# Patient Record
Sex: FEMALE | Race: WHITE | Employment: UNEMPLOYED | URBAN - METROPOLITAN AREA
[De-identification: names, ages, dates, MRNs, and addresses within clinical notes are randomized per-mention and may not be internally consistent; named-entity substitution may affect disease eponyms.]

---

## 2021-03-11 ENCOUNTER — HOSPITAL ENCOUNTER (EMERGENCY)
Age: 26
Discharge: HOME | End: 2021-03-11
Payer: COMMERCIAL

## 2021-03-11 VITALS
RESPIRATION RATE: 16 BRPM | BODY MASS INDEX: 21.66 KG/M2 | DIASTOLIC BLOOD PRESSURE: 84 MMHG | TEMPERATURE: 98.8 F | SYSTOLIC BLOOD PRESSURE: 117 MMHG | OXYGEN SATURATION: 100 % | HEIGHT: 65 IN | WEIGHT: 130 LBS | HEART RATE: 105 BPM

## 2021-03-11 DIAGNOSIS — L03.115 CELLULITIS OF RIGHT FOOT: Primary | ICD-10-CM

## 2021-03-11 DIAGNOSIS — F19.10 IV DRUG ABUSE (HCC): ICD-10-CM

## 2021-03-11 PROCEDURE — 99284 EMERGENCY DEPT VISIT MOD MDM: CPT

## 2021-03-11 PROCEDURE — 6370000000 HC RX 637 (ALT 250 FOR IP): Performed by: PHYSICIAN ASSISTANT

## 2021-03-11 RX ORDER — DOXYCYCLINE HYCLATE 100 MG
100 TABLET ORAL ONCE
Status: COMPLETED | OUTPATIENT
Start: 2021-03-11 | End: 2021-03-11

## 2021-03-11 RX ORDER — DOXYCYCLINE HYCLATE 100 MG
100 TABLET ORAL 2 TIMES DAILY
Qty: 20 TABLET | Refills: 0 | Status: SHIPPED | OUTPATIENT
Start: 2021-03-11 | End: 2021-03-21

## 2021-03-11 RX ADMIN — DOXYCYCLINE HYCLATE 100 MG: 100 TABLET, COATED ORAL at 14:13

## 2021-03-11 ASSESSMENT — PAIN DESCRIPTION - ORIENTATION
ORIENTATION: RIGHT
ORIENTATION: RIGHT

## 2021-03-11 ASSESSMENT — PAIN DESCRIPTION - PAIN TYPE
TYPE: ACUTE PAIN
TYPE: ACUTE PAIN

## 2021-03-11 ASSESSMENT — PAIN DESCRIPTION - LOCATION: LOCATION: FOOT

## 2021-03-11 NOTE — ED PROVIDER NOTES
[facility]  ED  eMERGENCY dEPARTMENT eNCOUnter        Pt Name: Ana Castillo  MRN: [id number]  [place] 1995  Date of evaluation: 3/11/2021  Provider: Vijay Saenz PA-C  PCP: Manfred Mascorro MD  ED Attending: Randy Prather MD    Patient was not seen by ED physician. History is provided by the patient    CHIEF COMPLAINT:  Foot Swelling (pt shot up in right foot and foot now red, swollen, and hot; swelling began today )      HISTORY OF PRESENT ILLNESS:  Ana Castillo is a 30 y.o. female who presents to the ED via private vehicle with complaints of right foot swelling and pain after attempting to shoot up meth in the foot. Patient states that this morning she attempted to inject methamphetamines into her right foot. Since this attempted she developed pain, swelling and a subtle redness to the top of her foot. She rates the pain 4/10. She states her tetanus vaccine is up-to-date. No other areas of skin change or soft tissue swelling. No other complaints, modifying factors or associated symptoms. Nursing notes reviewed.    Past Medical History:   Diagnosis Date    Bipolar 1 disorder ([facility]     Depression     Drug addiction ([facility]     heroin and meth    Hepatitis C virus infection 8/6/15    antibody +    MRSA (methicillin resistant staph aureus) culture positive 2012    Right knee     Past Surgical History:   Procedure Laterality Date    WISDOM TOOTH EXTRACTION       Family History   Problem Relation Age of Onset    Substance Abuse Mother     Alcohol Abuse Mother     Cancer Mother         cervical    Alcohol Abuse Maternal Grandmother     Cancer Maternal Grandmother     Alcohol Abuse Maternal Grandfather     Cancer Maternal Grandfather         lung     Social History     Socioeconomic History    Marital status: Single     Spouse name: Not on file    Number of children: Not on file    Years of education: Not on file    Highest education level: Not on file   Occupational History  Not on file   Social Needs    Financial resource strain: Not on file    Food insecurity     Worry: Not on file     Inability: Not on file    Transportation needs     Medical: Not on file     Non-medical: Not on file   Tobacco Use    Smoking status: Current Every Day Smoker     Packs/day: 1.00     Years: 13.00     Pack years: 13.00     Types: Cigarettes    Smokeless tobacco: Never Used   Substance and Sexual Activity    Alcohol use: Not Currently     Alcohol/week: 0.0 standard drinks    Drug use: Yes     Types: IV, Methamphetamines, Marijuana, Opiates      Comment: 3/11/21 last drug use    Sexual activity: Yes     Partners: Male   Lifestyle    Physical activity     Days per week: Not on file     Minutes per session: Not on file    Stress: Not on file   Relationships    Social connections     Talks on phone: Not on file     Gets together: Not on file     Attends Taoism service: Not on file     Active member of club or organization: Not on file     Attends meetings of clubs or organizations: Not on file     Relationship status: Not on file    Intimate partner violence     Fear of current or ex partner: Not on file     Emotionally abused: Not on file     Physically abused: Not on file     Forced sexual activity: Not on file   Other Topics Concern    Not on file   Social History Narrative    Not on file     No current facility-administered medications for this encounter. Current Outpatient Medications   Medication Sig Dispense Refill    doxycycline hyclate (VIBRA-TABS) 100 MG tablet Take 1 tablet by mouth 2 times daily for 10 days 20 tablet 0     No Known Allergies    REVIEW OF SYSTEMS:  6 systems reviewed, pertinent positives per HPI otherwise noted to be negative. PHYSICAL EXAM:  /84   Pulse 105   Temp 98.8 °F (37.1 °C) (Oral)   Resp 16   Ht 5' 5\" (1.651 m)   Wt 130 lb (59 kg)   LMP 02/19/2021   SpO2 100%   BMI 21.63 kg/m²   CONSTITUTIONAL: Awake and alert. Well-developed. Well-nourished. Non-toxic. Cooperative. No acute distress. HENT: Normocephalic. Atraumatic. External ears normal, without discharge. Nose normal. Mucous membranes moist.  EYES: Conjunctiva non-injected. No scleral icterus. PERRL. EOM's grossly intact. NECK: Supple. Normal ROM. CARDIOVASCULAR: Mild tachycardia. Intact distal pulses. PULMONARY/CHEST WALL: Breathing is unlabored. Equal, symmetric chest rise. Speaking comfortably in full sentences. ABDOMEN: Nondistended  MUSKULOSKELETAL: Right foot with mild, diffuse swelling. No acute deformity. Faint erythema to the dorsum of the foot along with an old tattoo. Mild tenderness to the top of the foot. No crepitus. Normal ROM. See picture of right foot below. SKIN: Warm and dry. NEUROLOGICAL: Alert and oriented x 3. Strength is 5/5 in all extremities and sensation is intact. PSYCHIATRIC: Normal affect      Right foot:              Labs:    None    RADIOLOGY:    None        ED COURSE/MDM:  Patient was given the following medications:  Medications   doxycycline hyclate (VIBRA-TABS) tablet 100 mg (100 mg Oral Given 3/11/21 1413)       I have evaluated this patient here in the ED. Patient arrives to the ED with right foot pain, swelling and faint redness that started after she attempted to inject methamphetamine to the top of her foot this morning. I do believe the symptoms are combination of skin/soft tissue irritation from the injection of the chemical but additionally likely brewing infection. Patient will be started on doxycycline. She will be given podiatry follow-up. Return precautions discussed. Patient was given scripts for the following medications. I counseled patient how to take these medications.    Discharge Medication List as of 3/11/2021  2:22 PM      START taking these medications    Details   doxycycline hyclate (VIBRA-TABS) 100 MG tablet Take 1 tablet by mouth 2 times daily for 10 days, Disp-20 tablet, R-0Normal           I estimate there is LOW risk for SEPSIS, ABSCESS, COMPARTMENT SYNDROME, NECROTIZING FASCIITIS, TENDON OR NEUROVASCULAR INJURY, or RETAINED FOREIGN BODY, thus I consider the discharge disposition reasonable. Also, there is no evidence or peritonitis, sepsis, or toxicity. Vic Mayen and I have discussed the diagnosis and risks, and we agree with discharging home to follow-up with their primary doctor. We also discussed returning to the Emergency Department immediately if new or worsening symptoms occur. We have discussed the symptoms which are most concerning (e.g., changing or worsening pain, fever, numbness, weakness, cool or painful digits) that necessitate immediate return. CLINICAL IMPRESSION:  1. Cellulitis of right foot    2. IV drug abuse (HCC)        Blood pressure 117/84, pulse 105, temperature 98.8 °F (37.1 °C), temperature source Oral, resp. rate 16, height 5' 5\" (1.651 m), weight 130 lb (59 kg), last menstrual period 02/19/2021, SpO2 100 %, not currently breastfeeding. PATIENT REFERRED TO:  Viv Lassiter DPM  [id number] [place] [address] [address] [place] [address] [address] [facility]  [phone]    Schedule an appointment as soon as possible for a visit       [facility]  ED  [facility] [address]  Go to   If symptoms worsen        DISPOSITION  Patient was discharged to home in good condition.           Estevan Graham  03/11/21 [id number]

## 2021-03-11 NOTE — ED NOTES
--Patient provided with discharge instructions and any prescriptions. --Instructions, dosing, and follow-up appointments reviewed with patient/family. No further questions or needs at this time. --Vital signs and patient stable upon discharge. --Patient ambulatory to [facility].        Valdo Lieberman RN  03/11/21 9123